# Patient Record
(demographics unavailable — no encounter records)

---

## 2024-11-09 NOTE — HISTORY OF PRESENT ILLNESS
[None] : Primary Fluoride Source: None [Up to date] : Up to date [Yes] : Cigarette smoke exposure [No] : Patient has not had sexual intercourse [With Teen] : teen [FreeTextEntry1] : 15yo here for well visit ADHD, anxiety - stopped going to therapy  11th grade, going well  no sports or clubs eating well normal urine/BM sleeping well - still taking melatonin  HEADSS neg   back pain recently past 1-2 years  back pain worse at school hard to sit  better when laying down, using heating pad

## 2024-11-09 NOTE — RISK ASSESSMENT
[1] : 2) Feeling down, depressed, or hopeless for several days (1) [PHQ-2 Positive] : PHQ-2 Positive [[id number]] : 2

## 2024-11-09 NOTE — PHYSICAL EXAM

## 2024-11-09 NOTE — HISTORY OF PRESENT ILLNESS
[None] : Primary Fluoride Source: None [Up to date] : Up to date [Yes] : Cigarette smoke exposure [No] : Patient has not had sexual intercourse [With Teen] : teen [FreeTextEntry1] : 17yo here for well visit ADHD, anxiety - stopped going to therapy  11th grade, going well  no sports or clubs eating well normal urine/BM sleeping well - still taking melatonin  HEADSS neg   back pain recently past 1-2 years  back pain worse at school hard to sit  better when laying down, using heating pad

## 2024-11-09 NOTE — DISCUSSION/SUMMARY
[Normal Growth] : growth [Normal Development] : development  [No Elimination Concerns] : elimination [Continue Regimen] : feeding [No Skin Concerns] : skin [Normal Sleep Pattern] : sleep [None] : no medical problems [Anticipatory Guidance Given] : Anticipatory guidance addressed as per the history of present illness section [Physical Growth and Development] : physical growth and development [Social and Academic Competence] : social and academic competence [Emotional Well-Being] : emotional well-being [Risk Reduction] : risk reduction [Violence and Injury Prevention] : violence and injury prevention [No Medications] : ~He/She~ is not on any medications [Patient] : patient [Parent/Guardian] : Parent/Guardian [Full Activity without restrictions including Physical Education & Athletics] : Full Activity without restrictions including Physical Education & Athletics [I have examined the above-named student and completed the preparticipation physical evaluation. The athlete does not present apparent clinical contraindications to practice and participate in sport(s) as outlined above. A copy of the physical exam is on r] : I have examined the above-named student and completed the preparticipation physical evaluation. The athlete does not present apparent clinical contraindications to practice and participate in sport(s) as outlined above. A copy of the physical exam is on record in my office and can be made available to the school at the request of the parents. If conditions arise after the athlete has been cleared for participation, the physician may rescind the clearance until the problem is resolved and the potential consequences are completely explained to the athlete (and parents/guardians). [] : The components of the vaccine(s) to be administered today are listed in the plan of care. The disease(s) for which the vaccine(s) are intended to prevent and the risks have been discussed with the caretaker.  The risks are also included in the appropriate vaccination information statements which have been provided to the patient's caregiver.  The caregiver has given consent to vaccinate. [FreeTextEntry1] : 16 year M here for well visit. No acute concerns for growth or development.  - menquadfi - Discussed and counseled on components of 5-2-1-0, healthy active living with patient and family. Recommended 5 servings of fruits and vegetables per day, less than 2 hours of screen time per day, 1 hour of exercise per day, and 0 sugar sweetened beverages ** focus on starting to exercise, gets no exercise now - back pain seems MSK in origin, start to exercise and stretch - good relationship with mom will seek counseling again if needed - RTO in 1 year for well visit or sooner PRN

## 2024-11-09 NOTE — RISK ASSESSMENT
[1] : 2) Feeling down, depressed, or hopeless for several days (1) [PHQ-2 Positive] : PHQ-2 Positive [[id number]] : 2

## 2024-11-09 NOTE — PHYSICAL EXAM
